# Patient Record
Sex: FEMALE | Race: BLACK OR AFRICAN AMERICAN | NOT HISPANIC OR LATINO | Employment: OTHER | URBAN - METROPOLITAN AREA
[De-identification: names, ages, dates, MRNs, and addresses within clinical notes are randomized per-mention and may not be internally consistent; named-entity substitution may affect disease eponyms.]

---

## 2017-05-17 ENCOUNTER — HOSPITAL ENCOUNTER (EMERGENCY)
Facility: HOSPITAL | Age: 52
Discharge: HOME | End: 2017-05-17
Attending: FAMILY MEDICINE
Payer: MEDICAID

## 2017-05-17 VITALS
TEMPERATURE: 99 F | RESPIRATION RATE: 18 BRPM | SYSTOLIC BLOOD PRESSURE: 111 MMHG | BODY MASS INDEX: 28.93 KG/M2 | OXYGEN SATURATION: 97 % | HEART RATE: 82 BPM | DIASTOLIC BLOOD PRESSURE: 87 MMHG | HEIGHT: 66 IN | WEIGHT: 180 LBS

## 2017-05-17 DIAGNOSIS — F41.9 ANXIETY: Primary | ICD-10-CM

## 2017-05-17 PROCEDURE — 99284 EMERGENCY DEPT VISIT MOD MDM: CPT | Mod: 25

## 2017-05-17 PROCEDURE — 93010 ELECTROCARDIOGRAM REPORT: CPT | Mod: ,,, | Performed by: INTERNAL MEDICINE

## 2017-05-17 PROCEDURE — 93005 ELECTROCARDIOGRAM TRACING: CPT

## 2017-05-17 NOTE — ED AVS SNAPSHOT
[facility]  [address] [place] [address]               Lexie Condon   2017 12:23 PM   ED    Description:  Female : 1965   Department:  [facility]           Your Care was Coordinated By:     Provider Role From To    Bib Payne MD Attending Provider 17 2719 --      Reason for Visit     Panic Attack           Diagnoses this Visit        Comments    Anxiety    -  Primary       ED Disposition     None           To Do List           [facility] On Call     [facility] On Call Nurse Care Line -  Assistance  Unless otherwise directed by your provider, please contact [facility], our nurse care line that is available for  assistance.     Registered nurses in the [facility] provide: appointment scheduling, clinical advisement, health education, and other advisory services.  Call: [phone] (toll free)               Medications           Message regarding Medications     Verify the changes and/or additions to your medication regime listed below are the same as discussed with your clinician today.  If any of these changes or additions are incorrect, please notify your healthcare provider.             Verify that the below list of medications is an accurate representation of the medications you are currently taking.  If none reported, the list may be blank. If incorrect, please contact your healthcare provider. Carry this list with you in case of emergency.           Current Medications     albuterol 90 mcg/actuation inhaler Inhale 1-2 puffs into the lungs every 6 (six) hours as needed for Wheezing or Shortness of Breath.    alprazolam (XANAX) 0.5 MG tablet Take 0.5 mg by mouth 2 (two) times daily as needed for Anxiety.    amiodarone (PACERONE) 200 MG Tab Take 1 tablet (200 mg total) by mouth 2 (two) times daily.    carvedilol (COREG) 12.5 MG tablet Take 1 tablet (12.5 mg total) by mouth 2 (two) times daily.    ferrous  "sulfate 325 (65 FE) MG EC tablet Take 1 tablet (325 mg total) by mouth once daily.    furosemide (LASIX) 40 MG tablet Take 40 mg by mouth 2 (two) times daily.    lisinopril (PRINIVIL,ZESTRIL) 2.5 MG tablet Take 1 tablet (2.5 mg total) by mouth once daily.    potassium chloride SA (K-DUR,KLOR-CON) 10 MEQ tablet Take 20 mEq by mouth once daily.    quetiapine (SEROQUEL) 300 MG Tab Take by mouth.    rivaroxaban (XARELTO) 10 mg Tab Take 2 tablets (20 mg total) by mouth daily with dinner or evening meal.           Clinical Reference Information           Your Vitals Were     BP Pulse Temp Resp Height Weight    111/87 (BP Location: Right arm, BP Method: Automatic) 82 98.6 °F (37 °C) (Oral) 18 5' 6" (1.676 m) 81.6 kg (180 lb)    SpO2 BMI             97% 29.05 kg/m2         Allergies as of 5/17/2017        Reactions    Penicillins Rash      Immunizations Administered on Date of Encounter - 5/17/2017     None      ED Micro, Lab, POCT     None      ED Imaging Orders     None        Discharge Instructions         Understanding Anxiety Disorders  Almost everyone gets nervous now and then. Its normal to have knots in your stomach before a test, or for your heart to race on a first date. But an anxiety disorder is much more than a case of nerves. In fact, its symptoms may be overwhelming. But treatment can relieve many of these symptoms. Talking to your doctor is the first step.    What are anxiety disorders?  An anxiety disorder causes intense feelings of panic and fear. These feelings may arise for no apparent reason. And they tend to recur again and again. They may prevent you from coping with life and cause you great distress. As a result, you may avoid anything that triggers your fear. In extreme cases, you may never leave the house. Anxiety disorders may cause other symptoms, such as:  · Obsessive thoughts you cant control  · Constant nightmares or painful thoughts of the past  · Nausea, sweating, and muscle " tension  · Difficulty sleeping or concentrating  What causes anxiety disorders?  Anxiety disorders tend to run in families. For some people, childhood abuse or neglect may play a role. For others, stressful life events or trauma may trigger anxiety disorders. Anxiety can trigger low self-esteem and poor coping skills.  Common anxiety disorders  · Panic disorder: This causes an intense fear of being in danger.  · Phobias: These are extreme fears of certain objects, places, or events.  · Obsessive-compulsive disorder: This causes you to have unwanted thoughts. You also may perform certain actions over and over.  · Posttraumatic stress disorder: This occurs in people who have survived a terrible ordeal. It can cause nightmares and flashbacks about the event.  · Generalized anxiety disorder: This causes constant worry that can greatly disrupt your life.   Getting better  You may believe that nothing can help you. Or, you might fear what others may think. But most anxiety symptoms can be eased. Having an anxiety disorder is nothing to be ashamed of. Most people do best with treatment that combines medication and therapy. Although these arent cures, they can help you live a healthier life.  Date Last Reviewed: 2/11/2015  © 9456-4988 [employer]. [address] [place] [address]. All rights reserved. This information is not intended as a substitute for professional medical care. Always follow your healthcare professional's instructions.          [facility] Sign-Up     Activating your [facility] account is as easy as 1-2-3!     1) Visit [email or web address], select Sign Up Now, enter this activation code and your date of birth, then select Next.  [email or web address]  Expires: 7/1/2017 12:47 PM      2) Create a username and password to use when you visit [facility] in the future and select a security question in case you lose your password and select Next.    3) Enter your e-mail address and click Sign  Up!    Additional Information  If you have questions, please e-mail [email or web address] or call [phone] to talk to our [facility] staff. Remember, [facility] is NOT to be used for urgent needs. For medical emergencies, dial 911.         Smoking Cessation     If you would like to quit smoking:   You may be eligible for free services if you are a [place] resident and started smoking cigarettes before September 1, 1988.  Call the Smoking Cessation Trust ([facility]) toll free at (752) 714-0883 or (233) 088-3389.   Call 2-761-QUIT-NOW if you do not meet the above criteria.   Contact us via email: [email or web address]   View our website for more information: [email or web address]         [facility] complies with applicable Federal civil rights laws and does not discriminate on the basis of race, color, national origin, age, disability, or sex.        Language Assistance Services     ATTENTION: Language assistance services are available, free of charge. Please call [phone].      ATENCIÓN: Si habla español, tiene a perez disposición servicios gratuitos de asistencia lingüística. Llame al [phone].     CHÚ Ý: N?u b?n nói Ti?ng Vi?t, có các d?ch v? h? tr? ngôn ng? mi?n phí dành cho b?n. G?i s? [phone].

## 2017-05-17 NOTE — ED PROVIDER NOTES
Encounter Date: 2017       History     Chief Complaint   Patient presents with    Panic Attack     My daughter put me out on side the road because she wanted my pain medicine and i said no so then i got anxious.      Review of patient's allergies indicates:   Allergen Reactions    Penicillins Rash     HPI Comments: Tenderness with her daughter in a car when they had an argument.  Patient claims that her daughter as for her pain pills which she denied.  Eventually the daughter dropped her on the street and left.  Then patient went to allow from across the street saying that she wants to her daughter.  During that.  Patient called EMS complaining of anxiety and chest pain.  She was brought into the ER by EMS.  On arrival patient says she wanted to leave.  Also the EMS noted that she wanted to be dropped on the street so that she can go home.    The history is provided by the patient.     Past Medical History:   Diagnosis Date    Anticoagulant long-term use     Asthma     Bipolar 1 disorder     Cardiomyopathy     CHF (congestive heart failure)     COPD (chronic obstructive pulmonary disease)     Coronary artery disease     Hypertension     Pericardial effusion 3-    Prediabetes      Past Surgical History:   Procedure Laterality Date     SECTION      PERICARDIAL WINDOW       Family History   Problem Relation Age of Onset    Hypertension Mother     Heart disease Mother     Heart disease Father     Hypertension Father      Social History   Substance Use Topics    Smoking status: Former Smoker     Types: Cigarettes     Quit date: 2014    Smokeless tobacco: None    Alcohol use No     Review of Systems   Constitutional: Negative for activity change, appetite change, chills and fever.   HENT: Negative for congestion and sore throat.    Eyes: Negative for pain, discharge, redness and itching.   Respiratory: Positive for chest tightness. Negative for cough, shortness of breath and  wheezing.    Cardiovascular: Negative for palpitations.   Gastrointestinal: Negative for abdominal pain, diarrhea, nausea and vomiting.   Genitourinary: Negative for dysuria.   Musculoskeletal: Negative for back pain, gait problem, neck pain and neck stiffness.   Skin: Negative for rash.   Neurological: Negative for dizziness, light-headedness and headaches.   Psychiatric/Behavioral: Negative for behavioral problems, confusion and hallucinations. The patient is not nervous/anxious.        Physical Exam   Initial Vitals   BP Pulse Resp Temp SpO2   05/17/17 1232 05/17/17 1232 05/17/17 1232 05/17/17 1232 05/17/17 1232   111/87 82 18 98.6 °F (37 °C) 97 %     Physical Exam    Nursing note and vitals reviewed.  Constitutional: She appears well-developed and well-nourished.   HENT:   Head: Normocephalic and atraumatic.   Eyes: Conjunctivae and EOM are normal. Pupils are equal, round, and reactive to light.   Neck: Normal range of motion. Neck supple.   Cardiovascular: Normal rate, regular rhythm, normal heart sounds and intact distal pulses. Exam reveals no gallop and no friction rub.    No murmur heard.  Pulmonary/Chest: Breath sounds normal. No respiratory distress. She has no wheezes. She has no rhonchi. She has no rales. She exhibits no tenderness.   Abdominal: Soft. Bowel sounds are normal. She exhibits no distension. There is no tenderness. There is no rebound and no guarding.   Musculoskeletal: Normal range of motion.   Neurological: She is alert and oriented to person, place, and time. She has normal strength and normal reflexes. She displays normal reflexes. No cranial nerve deficit or sensory deficit.   Skin: Skin is warm.         ED Course   Procedures  Labs Reviewed - No data to display  EKG Readings: (Independently Interpreted)   Rhythm: Normal Sinus Rhythm. Heart Rate: 83. Ectopy: No Ectopy. Conduction: Normal. ST Segments: Normal ST Segments. T Waves: Normal. Clinical Impression: Normal Sinus Rhythm                             ED Course     Clinical Impression:   The encounter diagnosis was Anxiety.    Disposition:   Disposition: Discharged  Condition: Guero Payne MD  05/17/17 2137

## 2017-05-17 NOTE — DISCHARGE INSTRUCTIONS
Understanding Anxiety Disorders  Almost everyone gets nervous now and then. Its normal to have knots in your stomach before a test, or for your heart to race on a first date. But an anxiety disorder is much more than a case of nerves. In fact, its symptoms may be overwhelming. But treatment can relieve many of these symptoms. Talking to your doctor is the first step.    What are anxiety disorders?  An anxiety disorder causes intense feelings of panic and fear. These feelings may arise for no apparent reason. And they tend to recur again and again. They may prevent you from coping with life and cause you great distress. As a result, you may avoid anything that triggers your fear. In extreme cases, you may never leave the house. Anxiety disorders may cause other symptoms, such as:  · Obsessive thoughts you cant control  · Constant nightmares or painful thoughts of the past  · Nausea, sweating, and muscle tension  · Difficulty sleeping or concentrating  What causes anxiety disorders?  Anxiety disorders tend to run in families. For some people, childhood abuse or neglect may play a role. For others, stressful life events or trauma may trigger anxiety disorders. Anxiety can trigger low self-esteem and poor coping skills.  Common anxiety disorders  · Panic disorder: This causes an intense fear of being in danger.  · Phobias: These are extreme fears of certain objects, places, or events.  · Obsessive-compulsive disorder: This causes you to have unwanted thoughts. You also may perform certain actions over and over.  · Posttraumatic stress disorder: This occurs in people who have survived a terrible ordeal. It can cause nightmares and flashbacks about the event.  · Generalized anxiety disorder: This causes constant worry that can greatly disrupt your life.   Getting better  You may believe that nothing can help you. Or, you might fear what others may think. But most anxiety symptoms can be eased. Having an anxiety  disorder is nothing to be ashamed of. Most people do best with treatment that combines medication and therapy. Although these arent cures, they can help you live a healthier life.  Date Last Reviewed: 2/11/2015  © 0723-7771 [employer]. [address] [place] [address], [place] [address]. All rights reserved. This information is not intended as a substitute for professional medical care. Always follow your healthcare professional's instructions.

## 2020-03-11 PROBLEM — I27.20 PULMONARY HYPERTENSION: Status: ACTIVE | Noted: 2020-03-11

## 2020-03-11 PROBLEM — I35.1 NONRHEUMATIC AORTIC VALVE INSUFFICIENCY: Status: ACTIVE | Noted: 2020-03-11

## 2020-03-11 PROBLEM — I50.20 HEART FAILURE WITH REDUCED EJECTION FRACTION, NYHA CLASS III: Status: ACTIVE | Noted: 2020-03-11

## 2020-03-11 PROBLEM — I51.7 RIGHT VENTRICULAR ENLARGEMENT: Status: ACTIVE | Noted: 2020-03-11

## 2020-03-11 PROBLEM — I44.7 LEFT BUNDLE BRANCH BLOCK: Status: ACTIVE | Noted: 2020-03-11

## 2020-03-11 PROBLEM — I07.1 TRICUSPID VALVE INSUFFICIENCY: Status: ACTIVE | Noted: 2020-03-11

## 2020-03-11 PROBLEM — I34.0 NONRHEUMATIC MITRAL VALVE REGURGITATION: Status: ACTIVE | Noted: 2020-03-11

## 2021-05-02 ENCOUNTER — OUTSIDE PLACE OF SERVICE (OUTPATIENT)
Dept: CARDIOLOGY | Facility: CLINIC | Age: 56
End: 2021-05-02
Payer: MEDICAID

## 2021-05-02 PROCEDURE — 93010 PR ELECTROCARDIOGRAM REPORT: ICD-10-PCS | Mod: ,,, | Performed by: INTERNAL MEDICINE

## 2021-05-02 PROCEDURE — 93010 ELECTROCARDIOGRAM REPORT: CPT | Mod: ,,, | Performed by: INTERNAL MEDICINE

## 2021-05-03 ENCOUNTER — CLINICAL SUPPORT (OUTPATIENT)
Dept: CARDIOLOGY | Facility: CLINIC | Age: 56
End: 2021-05-03
Attending: INTERNAL MEDICINE
Payer: MEDICAID

## 2021-05-03 ENCOUNTER — OUTSIDE PLACE OF SERVICE (OUTPATIENT)
Dept: CARDIOLOGY | Facility: CLINIC | Age: 56
End: 2021-05-03
Payer: MEDICAID

## 2021-05-03 DIAGNOSIS — I50.9 CONGESTIVE HEART FAILURE, UNSPECIFIED HF CHRONICITY, UNSPECIFIED HEART FAILURE TYPE: ICD-10-CM

## 2021-05-03 LAB
AORTIC VALVE CUSP SEPERATION: 2 CM
ASCENDING AORTA: 2.4 CM
AV INDEX (PROSTH): 1.13
AV MEAN GRADIENT: 5 MMHG
AV PEAK GRADIENT: 10 MMHG
AV VALVE AREA: 2 CM2
AV VELOCITY RATIO: 0.69
CV ECHO LV RWT: 0.46 CM
DOP CALC AO PEAK VEL: 1.6 M/S
DOP CALC AO VTI: 17.7 CM
DOP CALC LVOT AREA: 1.8 CM2
DOP CALC LVOT DIAMETER: 1.5 CM
DOP CALC LVOT PEAK VEL: 1.1 M/S
DOP CALC LVOT STROKE VOLUME: 35.33 CM3
DOP CALC RVOT PEAK VEL: 0.9 M/S
DOP CALCLVOT PEAK VEL VTI: 20 CM
E/E' RATIO: 40 M/S
ECHO LV POSTERIOR WALL: 1.2 CM (ref 0.6–1.1)
EJECTION FRACTION: 20 %
FRACTIONAL SHORTENING: 23 % (ref 28–44)
INTERVENTRICULAR SEPTUM: 1.4 CM (ref 0.6–1.1)
IVC PROX: 3.1 CM
IVRT: 138 MSEC
LA MAJOR: 7.1 CM
LA WIDTH: 5.1 CM
LEFT ATRIUM SIZE: 4.8 CM
LEFT INTERNAL DIMENSION IN SYSTOLE: 4 CM (ref 2.1–4)
LEFT VENTRICULAR INTERNAL DIMENSION IN DIASTOLE: 5.2 CM (ref 3.5–6)
LEFT VENTRICULAR MASS: 278.44 G
LV LATERAL E/E' RATIO: 35 M/S
LV SEPTAL E/E' RATIO: 46.67 M/S
LVOT MG: 1.8 MMHG
LVOT MV: 0.57 CM/S
MV PEAK E VEL: 1.4 M/S
MV STENOSIS PRESSURE HALF TIME: 55 MS
MV VALVE AREA P 1/2 METHOD: 4 CM2
PISA TR MAX VEL: 3.5 M/S
PV MEAN GRADIENT: 1 MMHG
RA MAJOR: 6.4 CM
RA PRESSURE: 15 MMHG
RA WIDTH: 5.2 CM
RIGHT VENTRICULAR END-DIASTOLIC DIMENSION: 3.7 CM
STJ: 3.1 CM
TDI LATERAL: 0.04 M/S
TDI SEPTAL: 0.03 M/S
TDI: 0.04 M/S
TR MAX PG: 49 MMHG
TRICUSPID ANNULAR PLANE SYSTOLIC EXCURSION: 1.4 CM
TV REST PULMONARY ARTERY PRESSURE: 64 MMHG

## 2021-05-03 PROCEDURE — 93306 TTE W/DOPPLER COMPLETE: CPT | Mod: 26,,, | Performed by: INTERNAL MEDICINE

## 2021-05-03 PROCEDURE — 93010 ELECTROCARDIOGRAM REPORT: CPT | Mod: ,,, | Performed by: INTERNAL MEDICINE

## 2021-05-03 PROCEDURE — 93010 PR ELECTROCARDIOGRAM REPORT: ICD-10-PCS | Mod: ,,, | Performed by: INTERNAL MEDICINE

## 2021-05-03 PROCEDURE — 93306 ECHO (CUPID ONLY): ICD-10-PCS | Mod: 26,,, | Performed by: INTERNAL MEDICINE

## 2021-05-19 ENCOUNTER — OUTSIDE PLACE OF SERVICE (OUTPATIENT)
Dept: CARDIOLOGY | Facility: CLINIC | Age: 56
End: 2021-05-19
Payer: MEDICAID

## 2021-05-19 PROCEDURE — 93010 PR ELECTROCARDIOGRAM REPORT: ICD-10-PCS | Mod: ,,, | Performed by: INTERNAL MEDICINE

## 2021-05-19 PROCEDURE — 93010 ELECTROCARDIOGRAM REPORT: CPT | Mod: ,,, | Performed by: INTERNAL MEDICINE
